# Patient Record
Sex: MALE | Race: WHITE | NOT HISPANIC OR LATINO | URBAN - METROPOLITAN AREA
[De-identification: names, ages, dates, MRNs, and addresses within clinical notes are randomized per-mention and may not be internally consistent; named-entity substitution may affect disease eponyms.]

---

## 2017-08-21 ENCOUNTER — OFFICE VISIT - HEALTHEAST (OUTPATIENT)
Dept: FAMILY MEDICINE | Facility: CLINIC | Age: 42
End: 2017-08-21

## 2017-08-21 DIAGNOSIS — E78.00 HYPERCHOLESTEREMIA: ICD-10-CM

## 2017-08-21 DIAGNOSIS — I10 HYPERTENSION: ICD-10-CM

## 2017-08-21 DIAGNOSIS — Z00.00 ROUTINE GENERAL MEDICAL EXAMINATION AT A HEALTH CARE FACILITY: ICD-10-CM

## 2017-08-21 LAB
CHOLEST SERPL-MCNC: 236 MG/DL
FASTING STATUS PATIENT QL REPORTED: YES
HDLC SERPL-MCNC: 64 MG/DL
LDLC SERPL CALC-MCNC: 131 MG/DL
TRIGL SERPL-MCNC: 204 MG/DL

## 2017-08-21 ASSESSMENT — MIFFLIN-ST. JEOR: SCORE: 1914.58

## 2017-08-22 ENCOUNTER — COMMUNICATION - HEALTHEAST (OUTPATIENT)
Dept: FAMILY MEDICINE | Facility: CLINIC | Age: 42
End: 2017-08-22

## 2017-08-22 LAB
ATRIAL RATE - MUSE: 76 BPM
DIASTOLIC BLOOD PRESSURE - MUSE: NORMAL MMHG
INTERPRETATION ECG - MUSE: NORMAL
P AXIS - MUSE: 19 DEGREES
PR INTERVAL - MUSE: 134 MS
QRS DURATION - MUSE: 90 MS
QT - MUSE: 368 MS
QTC - MUSE: 414 MS
R AXIS - MUSE: -1 DEGREES
SYSTOLIC BLOOD PRESSURE - MUSE: NORMAL MMHG
T AXIS - MUSE: 20 DEGREES
VENTRICULAR RATE- MUSE: 76 BPM

## 2017-08-23 ENCOUNTER — COMMUNICATION - HEALTHEAST (OUTPATIENT)
Dept: FAMILY MEDICINE | Facility: CLINIC | Age: 42
End: 2017-08-23

## 2017-11-07 ENCOUNTER — COMMUNICATION - HEALTHEAST (OUTPATIENT)
Dept: FAMILY MEDICINE | Facility: CLINIC | Age: 42
End: 2017-11-07

## 2018-01-09 ENCOUNTER — OFFICE VISIT - HEALTHEAST (OUTPATIENT)
Dept: FAMILY MEDICINE | Facility: CLINIC | Age: 43
End: 2018-01-09

## 2018-01-09 DIAGNOSIS — J00 ACUTE NASOPHARYNGITIS: ICD-10-CM

## 2018-01-09 ASSESSMENT — MIFFLIN-ST. JEOR: SCORE: 1966.74

## 2018-01-16 ENCOUNTER — COMMUNICATION - HEALTHEAST (OUTPATIENT)
Dept: FAMILY MEDICINE | Facility: CLINIC | Age: 43
End: 2018-01-16

## 2018-06-01 ENCOUNTER — COMMUNICATION - HEALTHEAST (OUTPATIENT)
Dept: FAMILY MEDICINE | Facility: CLINIC | Age: 43
End: 2018-06-01

## 2018-06-01 DIAGNOSIS — E78.5 HYPERLIPIDEMIA: ICD-10-CM

## 2018-08-22 ENCOUNTER — OFFICE VISIT - HEALTHEAST (OUTPATIENT)
Dept: FAMILY MEDICINE | Facility: CLINIC | Age: 43
End: 2018-08-22

## 2018-08-22 DIAGNOSIS — I10 BENIGN ESSENTIAL HYPERTENSION: ICD-10-CM

## 2018-08-22 DIAGNOSIS — Z00.00 ROUTINE GENERAL MEDICAL EXAMINATION AT A HEALTH CARE FACILITY: ICD-10-CM

## 2018-08-22 DIAGNOSIS — E66.9 OBESITY: ICD-10-CM

## 2018-08-22 DIAGNOSIS — E78.2 MIXED HYPERLIPIDEMIA: ICD-10-CM

## 2018-08-22 DIAGNOSIS — J45.909 ASTHMA: ICD-10-CM

## 2018-08-22 DIAGNOSIS — E78.2 MULTIPLE-TYPE HYPERLIPIDEMIA: ICD-10-CM

## 2018-08-22 LAB
ALBUMIN SERPL-MCNC: 4.3 G/DL (ref 3.5–5)
ALBUMIN UR-MCNC: ABNORMAL MG/DL
ALP SERPL-CCNC: 55 U/L (ref 45–120)
ALT SERPL W P-5'-P-CCNC: 100 U/L (ref 0–45)
ANION GAP SERPL CALCULATED.3IONS-SCNC: 11 MMOL/L (ref 5–18)
APPEARANCE UR: CLEAR
AST SERPL W P-5'-P-CCNC: 89 U/L (ref 0–40)
BACTERIA #/AREA URNS HPF: ABNORMAL HPF
BASOPHILS # BLD AUTO: 0 THOU/UL (ref 0–0.2)
BASOPHILS NFR BLD AUTO: 1 % (ref 0–2)
BILIRUB SERPL-MCNC: 0.8 MG/DL (ref 0–1)
BILIRUB UR QL STRIP: NEGATIVE
BUN SERPL-MCNC: 13 MG/DL (ref 8–22)
CALCIUM SERPL-MCNC: 9.8 MG/DL (ref 8.5–10.5)
CHLORIDE BLD-SCNC: 103 MMOL/L (ref 98–107)
CHOLEST SERPL-MCNC: 228 MG/DL
CO2 SERPL-SCNC: 26 MMOL/L (ref 22–31)
COLOR UR AUTO: YELLOW
CREAT SERPL-MCNC: 0.9 MG/DL (ref 0.7–1.3)
EOSINOPHIL # BLD AUTO: 0.2 THOU/UL (ref 0–0.4)
EOSINOPHIL NFR BLD AUTO: 4 % (ref 0–6)
ERYTHROCYTE [DISTWIDTH] IN BLOOD BY AUTOMATED COUNT: 11.6 % (ref 11–14.5)
FASTING STATUS PATIENT QL REPORTED: YES
GFR SERPL CREATININE-BSD FRML MDRD: >60 ML/MIN/1.73M2
GLUCOSE BLD-MCNC: 111 MG/DL (ref 70–125)
GLUCOSE UR STRIP-MCNC: NEGATIVE MG/DL
HBA1C MFR BLD: 5.9 % (ref 3.5–6)
HCT VFR BLD AUTO: 47.8 % (ref 40–54)
HDLC SERPL-MCNC: 73 MG/DL
HGB BLD-MCNC: 16.3 G/DL (ref 14–18)
HGB UR QL STRIP: NEGATIVE
KETONES UR STRIP-MCNC: NEGATIVE MG/DL
LDLC SERPL CALC-MCNC: 132 MG/DL
LEUKOCYTE ESTERASE UR QL STRIP: NEGATIVE
LYMPHOCYTES # BLD AUTO: 1.3 THOU/UL (ref 0.8–4.4)
LYMPHOCYTES NFR BLD AUTO: 36 % (ref 20–40)
MCH RBC QN AUTO: 31.9 PG (ref 27–34)
MCHC RBC AUTO-ENTMCNC: 34 G/DL (ref 32–36)
MCV RBC AUTO: 94 FL (ref 80–100)
MONOCYTES # BLD AUTO: 0.5 THOU/UL (ref 0–0.9)
MONOCYTES NFR BLD AUTO: 13 % (ref 2–10)
MUCOUS THREADS #/AREA URNS LPF: ABNORMAL LPF
NEUTROPHILS # BLD AUTO: 1.7 THOU/UL (ref 2–7.7)
NEUTROPHILS NFR BLD AUTO: 46 % (ref 50–70)
NITRATE UR QL: NEGATIVE
PH UR STRIP: 6 [PH] (ref 5–8)
PLATELET # BLD AUTO: 142 THOU/UL (ref 140–440)
PMV BLD AUTO: 9 FL (ref 7–10)
POTASSIUM BLD-SCNC: 4.3 MMOL/L (ref 3.5–5)
PROT SERPL-MCNC: 7.5 G/DL (ref 6–8)
RBC # BLD AUTO: 5.1 MILL/UL (ref 4.4–6.2)
RBC #/AREA URNS AUTO: ABNORMAL HPF
SODIUM SERPL-SCNC: 140 MMOL/L (ref 136–145)
SP GR UR STRIP: 1.02 (ref 1–1.03)
SQUAMOUS #/AREA URNS AUTO: ABNORMAL LPF
TRIGL SERPL-MCNC: 114 MG/DL
UROBILINOGEN UR STRIP-ACNC: ABNORMAL
WBC #/AREA URNS AUTO: ABNORMAL HPF
WBC: 3.6 THOU/UL (ref 4–11)

## 2018-08-22 ASSESSMENT — MIFFLIN-ST. JEOR: SCORE: 1937.83

## 2018-08-23 ENCOUNTER — COMMUNICATION - HEALTHEAST (OUTPATIENT)
Dept: FAMILY MEDICINE | Facility: CLINIC | Age: 43
End: 2018-08-23

## 2018-08-23 LAB — BACTERIA SPEC CULT: NO GROWTH

## 2019-03-20 ENCOUNTER — OFFICE VISIT - HEALTHEAST (OUTPATIENT)
Dept: FAMILY MEDICINE | Facility: CLINIC | Age: 44
End: 2019-03-20

## 2019-03-20 DIAGNOSIS — E78.2 MIXED HYPERLIPIDEMIA: ICD-10-CM

## 2019-03-20 DIAGNOSIS — F10.10 ALCOHOL ABUSE: ICD-10-CM

## 2019-03-20 DIAGNOSIS — F32.89 OTHER DEPRESSION: ICD-10-CM

## 2019-03-20 DIAGNOSIS — I10 BENIGN ESSENTIAL HYPERTENSION: ICD-10-CM

## 2019-03-20 ASSESSMENT — MIFFLIN-ST. JEOR: SCORE: 1875.57

## 2019-03-25 ENCOUNTER — OFFICE VISIT - HEALTHEAST (OUTPATIENT)
Dept: PHARMACY | Facility: CLINIC | Age: 44
End: 2019-03-25

## 2019-03-25 DIAGNOSIS — I10 BENIGN ESSENTIAL HYPERTENSION: ICD-10-CM

## 2019-03-25 DIAGNOSIS — F10.10 ALCOHOL ABUSE: ICD-10-CM

## 2019-03-25 DIAGNOSIS — G47.00 INSOMNIA, UNSPECIFIED TYPE: ICD-10-CM

## 2019-03-25 DIAGNOSIS — F32.A DEPRESSION, UNSPECIFIED DEPRESSION TYPE: ICD-10-CM

## 2019-03-25 DIAGNOSIS — E78.2 MULTIPLE-TYPE HYPERLIPIDEMIA: ICD-10-CM

## 2019-03-25 LAB
ALBUMIN SERPL-MCNC: 3.8 G/DL (ref 3.5–5)
ALP SERPL-CCNC: 45 U/L (ref 45–120)
ALT SERPL W P-5'-P-CCNC: 100 U/L (ref 0–45)
AST SERPL W P-5'-P-CCNC: 71 U/L (ref 0–40)
BILIRUB DIRECT SERPL-MCNC: 0.2 MG/DL
BILIRUB SERPL-MCNC: 0.2 MG/DL (ref 0–1)
PROT SERPL-MCNC: 6.7 G/DL (ref 6–8)

## 2019-03-26 ENCOUNTER — AMBULATORY - HEALTHEAST (OUTPATIENT)
Dept: PHARMACY | Facility: CLINIC | Age: 44
End: 2019-03-26

## 2019-03-26 DIAGNOSIS — F10.10 ALCOHOL ABUSE: ICD-10-CM

## 2019-04-21 ENCOUNTER — COMMUNICATION - HEALTHEAST (OUTPATIENT)
Dept: FAMILY MEDICINE | Facility: CLINIC | Age: 44
End: 2019-04-21

## 2019-04-21 DIAGNOSIS — F10.10 ALCOHOL ABUSE: ICD-10-CM

## 2019-04-25 ENCOUNTER — COMMUNICATION - HEALTHEAST (OUTPATIENT)
Dept: FAMILY MEDICINE | Facility: CLINIC | Age: 44
End: 2019-04-25

## 2019-04-25 DIAGNOSIS — F32.89 OTHER DEPRESSION: ICD-10-CM

## 2019-04-25 DIAGNOSIS — F10.10 ALCOHOL ABUSE: ICD-10-CM

## 2019-06-10 ENCOUNTER — COMMUNICATION - HEALTHEAST (OUTPATIENT)
Dept: FAMILY MEDICINE | Facility: CLINIC | Age: 44
End: 2019-06-10

## 2019-06-10 DIAGNOSIS — F10.10 ALCOHOL ABUSE: ICD-10-CM

## 2019-07-18 ENCOUNTER — COMMUNICATION - HEALTHEAST (OUTPATIENT)
Dept: FAMILY MEDICINE | Facility: CLINIC | Age: 44
End: 2019-07-18

## 2019-07-18 DIAGNOSIS — F10.10 ALCOHOL ABUSE: ICD-10-CM

## 2019-09-23 ENCOUNTER — OFFICE VISIT - HEALTHEAST (OUTPATIENT)
Dept: PHARMACY | Facility: CLINIC | Age: 44
End: 2019-09-23

## 2019-09-23 DIAGNOSIS — G47.00 INSOMNIA, UNSPECIFIED TYPE: ICD-10-CM

## 2019-09-23 DIAGNOSIS — F10.10 ALCOHOL ABUSE: ICD-10-CM

## 2019-09-23 DIAGNOSIS — F32.A DEPRESSION, UNSPECIFIED DEPRESSION TYPE: ICD-10-CM

## 2019-09-23 DIAGNOSIS — F32.89 OTHER DEPRESSION: ICD-10-CM

## 2019-09-23 LAB
ALBUMIN SERPL-MCNC: 4.1 G/DL (ref 3.5–5)
ALP SERPL-CCNC: 46 U/L (ref 45–120)
ALT SERPL W P-5'-P-CCNC: 79 U/L (ref 0–45)
AST SERPL W P-5'-P-CCNC: 59 U/L (ref 0–40)
BILIRUB DIRECT SERPL-MCNC: 0.2 MG/DL
BILIRUB SERPL-MCNC: 0.3 MG/DL (ref 0–1)
PROT SERPL-MCNC: 7.1 G/DL (ref 6–8)

## 2019-09-25 ENCOUNTER — AMBULATORY - HEALTHEAST (OUTPATIENT)
Dept: PHARMACY | Facility: CLINIC | Age: 44
End: 2019-09-25

## 2019-09-25 DIAGNOSIS — F10.10 ALCOHOL ABUSE: ICD-10-CM

## 2019-09-26 ENCOUNTER — COMMUNICATION - HEALTHEAST (OUTPATIENT)
Dept: FAMILY MEDICINE | Facility: CLINIC | Age: 44
End: 2019-09-26

## 2019-09-26 DIAGNOSIS — I10 BENIGN ESSENTIAL HYPERTENSION: ICD-10-CM

## 2019-09-26 DIAGNOSIS — F10.10 ALCOHOL ABUSE: ICD-10-CM

## 2019-09-30 ENCOUNTER — COMMUNICATION - HEALTHEAST (OUTPATIENT)
Dept: FAMILY MEDICINE | Facility: CLINIC | Age: 44
End: 2019-09-30

## 2019-10-10 ENCOUNTER — AMBULATORY - HEALTHEAST (OUTPATIENT)
Dept: PHARMACY | Facility: CLINIC | Age: 44
End: 2019-10-10

## 2019-10-10 DIAGNOSIS — F41.9 ANXIETY: ICD-10-CM

## 2019-10-10 RX ORDER — HYDROXYZINE PAMOATE 25 MG/1
25-50 CAPSULE ORAL EVERY 6 HOURS PRN
Qty: 60 CAPSULE | Refills: 2 | Status: SHIPPED | OUTPATIENT
Start: 2019-10-10

## 2019-10-22 ENCOUNTER — COMMUNICATION - HEALTHEAST (OUTPATIENT)
Dept: FAMILY MEDICINE | Facility: CLINIC | Age: 44
End: 2019-10-22

## 2019-10-22 DIAGNOSIS — F10.10 ALCOHOL ABUSE: ICD-10-CM

## 2019-10-28 ENCOUNTER — COMMUNICATION - HEALTHEAST (OUTPATIENT)
Dept: NURSING | Facility: CLINIC | Age: 44
End: 2019-10-28

## 2019-10-28 DIAGNOSIS — F10.10 ALCOHOL ABUSE: ICD-10-CM

## 2019-11-20 ENCOUNTER — COMMUNICATION - HEALTHEAST (OUTPATIENT)
Dept: FAMILY MEDICINE | Facility: CLINIC | Age: 44
End: 2019-11-20

## 2019-11-20 DIAGNOSIS — F32.89 OTHER DEPRESSION: ICD-10-CM

## 2020-01-01 ENCOUNTER — COMMUNICATION - HEALTHEAST (OUTPATIENT)
Dept: FAMILY MEDICINE | Facility: CLINIC | Age: 45
End: 2020-01-01

## 2020-01-01 ENCOUNTER — RECORDS - HEALTHEAST (OUTPATIENT)
Dept: ADMINISTRATIVE | Facility: OTHER | Age: 45
End: 2020-01-01

## 2020-01-01 ENCOUNTER — COMMUNICATION - HEALTHEAST (OUTPATIENT)
Dept: SCHEDULING | Facility: CLINIC | Age: 45
End: 2020-01-01

## 2020-01-01 ENCOUNTER — AMBULATORY - HEALTHEAST (OUTPATIENT)
Dept: FAMILY MEDICINE | Facility: CLINIC | Age: 45
End: 2020-01-01

## 2020-01-01 ENCOUNTER — RECORDS - HEALTHEAST (OUTPATIENT)
Dept: HEALTH INFORMATION MANAGEMENT | Facility: CLINIC | Age: 45
End: 2020-01-01

## 2020-01-01 ENCOUNTER — COMMUNICATION - HEALTHEAST (OUTPATIENT)
Dept: NURSING | Facility: CLINIC | Age: 45
End: 2020-01-01

## 2020-01-01 DIAGNOSIS — F32.89 OTHER DEPRESSION: ICD-10-CM

## 2020-01-01 DIAGNOSIS — F32.89 OTHER SPECIFIED DEPRESSIVE EPISODES: ICD-10-CM

## 2020-01-01 DIAGNOSIS — F10.10 ALCOHOL ABUSE: ICD-10-CM

## 2020-01-01 DIAGNOSIS — I10 BENIGN ESSENTIAL HYPERTENSION: ICD-10-CM

## 2020-01-01 DIAGNOSIS — J00 ACUTE NASOPHARYNGITIS: ICD-10-CM

## 2020-01-01 LAB
ALT SERPL W/O P-5'-P-CCNC: 50 U/L
AST SERPL-CCNC: 178 IU/L (ref 15–46)
CREAT SERPL-MCNC: 0.5 MG/DL (ref 0.66–1.25)
GFR ESTIMATE EXT - HISTORICAL: >60 ML/MIN/1.73M2
GFR ESTIMATE, IF BLACK EXT - HISTORICAL: >60 ML/MIN/1.73M2

## 2020-01-01 RX ORDER — ALBUTEROL SULFATE 90 UG/1
2 AEROSOL, METERED RESPIRATORY (INHALATION) EVERY 4 HOURS PRN
Qty: 8.5 INHALER | Refills: 2 | Status: SHIPPED | OUTPATIENT
Start: 2020-01-01

## 2020-01-01 RX ORDER — CLONIDINE HYDROCHLORIDE 0.1 MG/1
TABLET ORAL
Qty: 90 TABLET | Refills: 0 | Status: SHIPPED | OUTPATIENT
Start: 2020-01-01

## 2020-01-01 RX ORDER — TRAZODONE HYDROCHLORIDE 50 MG/1
TABLET, FILM COATED ORAL
Qty: 180 TABLET | Refills: 0 | Status: SHIPPED | OUTPATIENT
Start: 2020-01-01

## 2021-01-01 ENCOUNTER — RECORDS - HEALTHEAST (OUTPATIENT)
Dept: INTENSIVE CARE | Facility: HOSPITAL | Age: 46
End: 2021-01-01

## 2021-01-01 ENCOUNTER — ANESTHESIA - HEALTHEAST (OUTPATIENT)
Dept: INTENSIVE CARE | Facility: HOSPITAL | Age: 46
End: 2021-01-01

## 2021-01-20 ENCOUNTER — COMMUNICATION - HEALTHEAST (OUTPATIENT)
Dept: PULMONOLOGY | Facility: OTHER | Age: 46
End: 2021-01-20

## 2021-05-26 NOTE — PROGRESS NOTES
[facility]  Assessment & Plan                                                     Depression/Anxiety: Uncontrolled. In addition to alcohol addiction, patient has stressor of his marriage currently. Reviewed starting an SSRI to help with his anxiety which may indirectly help with his cravings as well. Reviewed exprectations and side effects. Will stop clonidine today due to unclear benefit.   PLAN:  1. Stop clonidine  2. Start sertraline 50 mg daily.     Etoh Abuse: Patient is undergoing outpatient therapy, which should hopefully be helpful for him. Reviewed medications used for cravings -- he would like to address the cravings, therefore recommended starting a medication once his liver enzymes return. Testing LFTs today for baseline.   Ideally will start naltrexone 50 mg as long as liver function is WNL. Otherwise will consider acamprosate, although adherence is a barrier and will need to closely monitor for suicidal thinking.   PLAN:   1. Liver function tests today.     Insomnia: Slightly improved with addition of trazodone. Appears that his underlying anxiety is contributing to his insomnia, therefore will see if improves with sertraline start. Reviewed that if sleep starts to improve, he can use trazodone PRN.     Hypertension: BP well controlled, continue current regimen and will continue monitoring.     Hyperlipidemia: Stable. Will have him continue atorvastatin at this time. If LFTs are elevated, will hold atorvastatin. Consider rechecking lipids in the future now that he is no longer drinking.     Follow Up  Phone call when liver function labs return    Subjective & Objective                                                     Jamey Gallegos is a 43 y.o. male coming in for an initial visit for Medication Therapy Management. He was referred to me from Jake Singleton MD    Chief Complaint: cravings and depression/anxiety    Medication Adherence/Access: Brought a list of the medications he is  "taking.     Depression/Anxiety: 1.5 mos ago wife wanted a divorce and his drinking contributed. Has been depressed for a while and anxiety. Now anxiety is off the charts. Trouble concentrating. Found out his wife was cheating on him emotionally. Now seeing a counselor and attending outpatient rehab at [facility]. Does not have much of a support system, but has some friends he may reach out to.     Etoh Abuse: Weekends are tough use to drink a lot. Cravings are routine based. Used to have wine with dinner. Wife is a trigger. Sober now since 3/9. Reports that he was a \"high-functioning alcoholic.\" Would be sneaking shots of vodka. Now no alcohol at home. Reports having cravings, but he thinks it may be a byproduct of his anxiety. He is concerned about the cravings and would like to address them.   Last liver function tested 8/22/18    Insomnia: Started on trazodone 50 mg HS -- added 3/20/19. Reports that he takes about an hour before bedtime and it does make him sleeping. Makes him sleepy. Was not sleeping well or restful. Waking up and not falling back asleep. Not getting out of his head. Now sleeping better, but will still wake up 1-2 times, which is not as much as before.     Hypertension: Currently taking lisinopril hydrochlorothiazide 20-12.5 mg daily and clonidine 0.1 mg nightly (clonidine added 3/20/2019).     Hyperlipidemia: Currently taking atorvastatin 10 mg AM. Denies myalgias. Last lipids checked 8/22/2018.      PMH: reviewed in EPIC   Allergies/ADRs: reviewed in EPIC   Alcohol: reviewed in EPIC   Tobacco:   Social History     Tobacco Use   Smoking Status Never Smoker   Smokeless Tobacco Never Used     Today's Vitals: There were no vitals filed for this visit.  ----------------    Much or all of the text in this note was generated through the use of Dragon Dictate voice-to-text software. Errors in spelling or words which seem out of context are unintentional. Sound alike errors, in particular, may have " escaped editing.    The patient was given a summary of these recommendations as an after visit summary    I spent 45 minutes with this patient today.   All changes were made via collaborative practice agreement with Jake Singleton MD. A copy of the visit note was provided to the patient's provider.     Sherri Delgado, Pharm.D., [facility]  Medication Therapy Management Pharmacist  [facility]     Current Outpatient Medications   Medication Sig Dispense Refill     albuterol (PROAIR HFA) 90 mcg/actuation inhaler Inhale 2 puffs every 4 (four) hours as needed for wheezing. 8.5 Inhaler 2     atorvastatin (LIPITOR) 10 MG tablet Take 1 tablet (10 mg total) by mouth at bedtime. 90 tablet 3     cloNIDine HCl (CATAPRES) 0.1 MG tablet One tablet at bedtime 60 tablet 2     lisinopril-hydrochlorothiazide (ZESTORETIC) 20-12.5 mg per tablet Take 1 tablet by mouth daily. 90 tablet 3     loratadine (CLARITIN) 10 mg tablet Take 1 tablet (10 mg total) by mouth daily. 30 tablet 2     MULTIVIT &MINERALS/FERROUS FUM (MULTI VITAMIN ORAL) Take 1 tablet by mouth daily.       predniSONE (DELTASONE) 20 MG tablet Take 2 tablets (40 mg total) by mouth daily. 10 tablet 0     traZODone (DESYREL) 50 MG tablet Take 1 tablet (50 mg total) by mouth at bedtime. 30 tablet 1     No current facility-administered medications for this visit.

## 2021-05-27 NOTE — PATIENT INSTRUCTIONS - HE
Recommendations from today's PharmD medication management visit                                                       1. Stop clonidine.     2. Start sertraline 50 mg daily.     3. We will check your liver enzymes today. I will call you when those returns.     Next pharmacist visit: Phone call after labs return    My Pharmacist's contact information:                                                       It was a pleasure seeing you today to discuss your medications!  Please feel free to contact me with any questions or concerns you have. I look forward to seeing you again to help you get the most benefit from your medications!    To reschedule your PharmD appointment, please call the clinic directly or you may call the MTM scheduling line at [phone] or toll-free at [phone]:   [facility] (available for appointments Mondays and Thursdays)  [facility] (available for appointments Tuesday, Wednesday & Friday)     Sherri Delgado, Darius, BCACP  Medication Therapy Management Pharmacist  [facility]    You may receive a survey about the [facility] services you received by email and/or US Mail.  I would appreciate your feedback to help me serve you better in the future. Your comments will be anonymous.

## 2021-05-28 NOTE — TELEPHONE ENCOUNTER
RN cannot approve Refill Request: Naltrexone    RN can NOT refill this medication med is not covered by policy/route to provider. Last office visit: 3/20/2019 Jake Singleton MD Last Physical: 8/22/2018 Last MTM visit: Visit date not found Last visit same specialty: 3/20/2019 Jake Singleton MD.  Next visit within 3 mo: Visit date not found  Next physical within 3 mo: Visit date not found      Brigid Etienne, [facility] Connection Triage/Med Refill 4/28/2019    Requested Prescriptions   Pending Prescriptions Disp Refills     naltrexone (DEPADE) 50 mg tablet [Pharmacy Med Name: NALTREXONE 50 MG TABLET] 30 tablet 0     Sig: TAKE 1 TABLET BY MOUTH EVERY DAY       There is no refill protocol information for this order      Signed Prescriptions Disp Refills    traZODone (DESYREL) 50 MG tablet 30 tablet 11     Sig: Take 1 tablet (50 mg total) by mouth at bedtime.       Tricyclics/Misc Antidepressant/Antianxiety Meds Refill Protocol Passed - 4/25/2019  5:39 PM        Passed - PCP or prescribing provider visit in last year     Last office visit with prescriber/PCP: 3/20/2019 Jake Singleton MD OR same dept: 3/20/2019 Jake Singleton MD OR same specialty: 3/20/2019 Jake Singleton MD  Last physical: 8/22/2018 Last MTM visit: Visit date not found   Next visit within 3 mo: Visit date not found  Next physical within 3 mo: Visit date not found  Prescriber OR PCP: Jake Singleton MD  Last diagnosis associated with med order: 1. Alcohol abuse  - naltrexone (DEPADE) 50 mg tablet [Pharmacy Med Name: NALTREXONE 50 MG TABLET]; TAKE 1 TABLET BY MOUTH EVERY DAY  Dispense: 30 tablet; Refill: 0  - sertraline (ZOLOFT) 50 MG tablet [Pharmacy Med Name: SERTRALINE HCL 50 MG TABLET]; Take 1 tablet (50 mg total) by mouth daily.  Dispense: 30 tablet; Refill: 0    2. Other depression  - traZODone (DESYREL) 50 MG tablet; Take 1 tablet (50 mg total) by mouth at bedtime.  Dispense: 30 tablet; Refill: 11    If protocol passes may refill for 12 months if  within 3 months of last provider visit (or a total of 15 months).           Refused Prescriptions Disp Refills     sertraline (ZOLOFT) 50 MG tablet [Pharmacy Med Name: SERTRALINE HCL 50 MG TABLET] 30 tablet 0     Sig: Take 1 tablet (50 mg total) by mouth daily.       SSRI Refill Protocol  Passed - 4/25/2019  5:39 PM        Passed - PCP or prescribing provider visit in last year     Last office visit with prescriber/PCP: 3/20/2019 Jake Singleton MD OR same dept: 3/20/2019 Jake Singleton MD OR same specialty: 3/20/2019 Jake Singleton MD  Last physical: 8/22/2018 Last MTM visit: Visit date not found   Next visit within 3 mo: Visit date not found  Next physical within 3 mo: Visit date not found  Prescriber OR PCP: Jake Singleton MD  Last diagnosis associated with med order: 1. Alcohol abuse  - naltrexone (DEPADE) 50 mg tablet [Pharmacy Med Name: NALTREXONE 50 MG TABLET]; TAKE 1 TABLET BY MOUTH EVERY DAY  Dispense: 30 tablet; Refill: 0  - sertraline (ZOLOFT) 50 MG tablet [Pharmacy Med Name: SERTRALINE HCL 50 MG TABLET]; Take 1 tablet (50 mg total) by mouth daily.  Dispense: 30 tablet; Refill: 0    2. Other depression  - traZODone (DESYREL) 50 MG tablet; Take 1 tablet (50 mg total) by mouth at bedtime.  Dispense: 30 tablet; Refill: 11    If protocol passes may refill for 12 months if within 3 months of last provider visit (or a total of 15 months).

## 2021-05-28 NOTE — TELEPHONE ENCOUNTER
Refill Approved    Rx renewed per Medication Renewal Policy. Medication was last renewed on 3/25/19.    Nasrin Lui, [facility] Connection Triage/Med Refill 4/23/2019     Requested Prescriptions   Pending Prescriptions Disp Refills     sertraline (ZOLOFT) 50 MG tablet [Pharmacy Med Name: SERTRALINE HCL 50 MG TABLET] 30 tablet 0     Sig: TAKE 1 TABLET BY MOUTH EVERY DAY       SSRI Refill Protocol  Passed - 4/21/2019  8:41 AM        Passed - PCP or prescribing provider visit in last year     Last office visit with prescriber/PCP: 3/20/2019 Jake Singleton MD OR same dept: 3/20/2019 Jake Singleton MD OR same specialty: 3/20/2019 Jake Singleton MD  Last physical: 8/22/2018 Last MTM visit: Visit date not found   Next visit within 3 mo: Visit date not found  Next physical within 3 mo: Visit date not found  Prescriber OR PCP: Jake Singleton MD  Last diagnosis associated with med order: 1. Alcohol abuse  - sertraline (ZOLOFT) 50 MG tablet [Pharmacy Med Name: SERTRALINE HCL 50 MG TABLET]; TAKE 1 TABLET BY MOUTH EVERY DAY  Dispense: 30 tablet; Refill: 0    If protocol passes may refill for 12 months if within 3 months of last provider visit (or a total of 15 months).

## 2021-05-28 NOTE — TELEPHONE ENCOUNTER
Refill Approved: Trazodone    Rx renewed per Medication Renewal Policy. Medication was last renewed on 3/20/2019 with 1 refill.   Last office visit: 3/25/2019 with MTM MUKUND Delgado, PharmD, 3/20/2019 with PCP Dr PRINCESS Doty.     Brigid Etienne, [facility] Triage/Med Refill 4/28/2019     Requested Prescriptions   Pending Prescriptions Disp Refills     naltrexone (DEPADE) 50 mg tablet [Pharmacy Med Name: NALTREXONE 50 MG TABLET] 30 tablet 0     Sig: TAKE 1 TABLET BY MOUTH EVERY DAY       There is no refill protocol information for this order        traZODone (DESYREL) 50 MG tablet [Pharmacy Med Name: TRAZODONE 50 MG TABLET] 30 tablet 1     Sig: TAKE 1 TABLET BY MOUTH EVERYDAY AT BEDTIME       Tricyclics/Misc Antidepressant/Antianxiety Meds Refill Protocol Passed - 4/25/2019  5:39 PM        Passed - PCP or prescribing provider visit in last year     Last office visit with prescriber/PCP: 3/20/2019 Jake Singleton MD OR same dept: 3/20/2019 Jake Singleton MD OR same specialty: 3/20/2019 Jake Singleton MD  Last physical: 8/22/2018 Last MTM visit: Visit date not found   Next visit within 3 mo: Visit date not found  Next physical within 3 mo: Visit date not found  Prescriber OR PCP: Jake Singleton MD  Last diagnosis associated with med order: 1. Alcohol abuse  - naltrexone (DEPADE) 50 mg tablet [Pharmacy Med Name: NALTREXONE 50 MG TABLET]; TAKE 1 TABLET BY MOUTH EVERY DAY  Dispense: 30 tablet; Refill: 0  - sertraline (ZOLOFT) 50 MG tablet [Pharmacy Med Name: SERTRALINE HCL 50 MG TABLET]; TAKE 1 TABLET BY MOUTH EVERY DAY  Dispense: 30 tablet; Refill: 0    2. Other depression  - traZODone (DESYREL) 50 MG tablet [Pharmacy Med Name: TRAZODONE 50 MG TABLET]; TAKE 1 TABLET BY MOUTH EVERYDAY AT BEDTIME  Dispense: 30 tablet; Refill: 1    If protocol passes may refill for 12 months if within 3 months of last provider visit (or a total of 15 months).             sertraline (ZOLOFT) 50 MG tablet [Pharmacy Med Name: SERTRALINE HCL 50  MG TABLET] 30 tablet 0     Sig: TAKE 1 TABLET BY MOUTH EVERY DAY       SSRI Refill Protocol  Passed - 4/25/2019  5:39 PM        Passed - PCP or prescribing provider visit in last year     Last office visit with prescriber/PCP: 3/20/2019 Jake Singleton MD OR same dept: 3/20/2019 Jake Singleton MD OR same specialty: 3/20/2019 Jake Singleton MD  Last physical: 8/22/2018 Last MTM visit: Visit date not found   Next visit within 3 mo: Visit date not found  Next physical within 3 mo: Visit date not found  Prescriber OR PCP: Jake Singleton MD  Last diagnosis associated with med order: 1. Alcohol abuse  - naltrexone (DEPADE) 50 mg tablet [Pharmacy Med Name: NALTREXONE 50 MG TABLET]; TAKE 1 TABLET BY MOUTH EVERY DAY  Dispense: 30 tablet; Refill: 0  - sertraline (ZOLOFT) 50 MG tablet [Pharmacy Med Name: SERTRALINE HCL 50 MG TABLET]; TAKE 1 TABLET BY MOUTH EVERY DAY  Dispense: 30 tablet; Refill: 0    2. Other depression  - traZODone (DESYREL) 50 MG tablet [Pharmacy Med Name: TRAZODONE 50 MG TABLET]; TAKE 1 TABLET BY MOUTH EVERYDAY AT BEDTIME  Dispense: 30 tablet; Refill: 1    If protocol passes may refill for 12 months if within 3 months of last provider visit (or a total of 15 months).

## 2021-05-29 NOTE — TELEPHONE ENCOUNTER
RN cannot approve Refill Request    RN can NOT refill this medication med is not covered by policy/route to provider. Last office visit: 3/20/2019 Jake Singleton MD Last Physical: 8/22/2018 Last MTM visit: Visit date not found Last visit same specialty: 3/20/2019 Jake Singleton MD.  Next visit within 3 mo: Visit date not found  Next physical within 3 mo: Visit date not found      Brigid Etienne, [facility] Triage/Med Refill 6/11/2019    Requested Prescriptions   Pending Prescriptions Disp Refills     naltrexone (DEPADE) 50 mg tablet [Pharmacy Med Name: NALTREXONE 50 MG TABLET] 30 tablet 0     Sig: TAKE 1 TABLET BY MOUTH EVERY DAY       There is no refill protocol information for this order

## 2021-05-30 NOTE — TELEPHONE ENCOUNTER
RN cannot approve Refill Request    RN can NOT refill this medication med is not covered by policy/route to provider. Last office visit: 3/20/2019 Jake Singleton MD Last Physical: 8/22/2018 Last MTM visit: Visit date not found Last visit same specialty: 3/20/2019 Jake Singleton MD.  Next visit within 3 mo: Visit date not found  Next physical within 3 mo: Visit date not found      Nicole Forte, [facility] Connection Triage/Med Refill 7/18/2019    Requested Prescriptions   Pending Prescriptions Disp Refills     naltrexone (DEPADE) 50 mg tablet [Pharmacy Med Name: NALTREXONE 50 MG TABLET] 30 tablet 0     Sig: TAKE 1 TABLET BY MOUTH EVERY DAY       There is no refill protocol information for this order

## 2021-05-31 VITALS — WEIGHT: 239.5 LBS | HEIGHT: 69 IN | BODY MASS INDEX: 35.47 KG/M2

## 2021-05-31 VITALS — BODY MASS INDEX: 33.77 KG/M2 | HEIGHT: 69 IN | WEIGHT: 228 LBS

## 2021-06-01 VITALS — HEIGHT: 69 IN | WEIGHT: 233.13 LBS | BODY MASS INDEX: 34.53 KG/M2

## 2021-06-01 NOTE — PATIENT INSTRUCTIONS - HE
Recommendations from today's PharmD medication management visit                                                       1. Increase sertraline to 100 mg daily.     2. I will refill trazodone -- ok to take 100 mg at night if needed for sleep.     3. Use clonidine as needed for anxiety    4. I will refill the naltrexone -- ok to use daily for cravings.     Next pharmacist visit: Trang in 1 week    It was great to speak with you today.  I value your experience and would be very thankful for your time with providing feedback on our clinic survey. You may receive a survey via email or text message in the next few days.     My Pharmacist's contact information:                                                       It was a pleasure seeing you today to discuss your medications!  Please feel free to contact me with any questions or concerns you have. I look forward to seeing you again to help you get the most benefit from your medications!    To reschedule your PharmD appointment, please call the clinic directly or you may call the [facility] scheduling line at [phone] or toll-free at [phone]:   [facility] (available for appointments Mondays and Thursdays)  [facility] (available for appointments Tuesday, Wednesday & Friday)     Sherri Delgado, Darius, BCACP  Medication Therapy Management Pharmacist  [facility]

## 2021-06-01 NOTE — PROGRESS NOTES
MTM Follow Up Encounter  Assessment & Plan                                                     Depression/Anxiety: Uncontrolled. Patient was visibly anxious today -- very shaky and sweaty. Reviewed that he is on a low dose of sertraline, therefore before switching to another SSRI will increase to 100 mg. If no improvement at all at 4 weeks, then will switch to a different medication, but if some improvement then will continue and consider maximizing the dose to 200 mg. He was agreeable. Until then will have him use clonidine PRN anxiety. I will MyChart him after a week and if not helpful, will try hydroxyzine. Will want to avoid benzos due to his alcohol addiction.   PLAN:   1. Increase sertraline to 100 mg daily  2. Use clonidine 0.1 mg PRN anxiety     Etoh Abuse: Congratulated patient on being sober. Continue attending therapy and AA meetings. Will have him take naltrexone regularly to avoid any trigger for alcohol, but will check hepatic function today before refilling. Will have him stay off atorvastatin for now, but can consider restarting in the future depending on LFTs.   PLAN:   1. Hepatic function today     Insomnia: Unsure if trazodone is helping, but he is also having horrible anxiety. See above. Will also change the trazodone prescription so that he can take 100 mg if needed.   PLAN:   1. Increase trazodone to  mg HS PRN    Follow Up  1 week Trang    Subjective & Objective                                                       Jamey Gallegos is a 43 y.o. male coming in for a follow up visit for Medication Therapy Management. He was referred to me from Jake Singleton MD and referred by self for today's appointment.     Chief Complaint: Worsening depression and anxiety.     Medication Adherence/Access: Taking medications daily.     Depression/Anxiety: Going through a divorce with his wife -- they are still living together and in the process. Reports depression and anxiety, but anxiety now very  "bad. Continues to see counselor and one at work. Does have support system at work, bonita has been very understanding. At last MTM appt, stopped clonidine and started sertraline 50 mg daily. Not sure if he has seen much of an improvement. Reports divorce process is debilitating, went to a hotel the other day. When panic attack, heart races, tension in chest, nervous energy. Would like to avoid addictive substances. Has clonidine at home but not using.      Etoh Abuse: Sober since 3/9. Reports that he was a \"high-functioning alcoholic.\" At last MTM appt, started patient on naltrexone 50 mg daily -- was taking daily, but now taking PRN. Will take when he has a craving or a tough day. Has cravings, but does not act on them. Completed rehab. Goes to .   Last liver function tested 3/25/19 -- atorvastatin 10 mg on hold.      Insomnia: Started on trazodone 50 mg HS -- added 3/20/19. Reports that he is having a hard time sleeping -- he wonders if it has to do with the anxiety, but not sure if trazodone is helping.       PMH: reviewed in EPIC   Allergies/ADRs: reviewed in EPIC   Alcohol: reviewed in Epic -- sober now  Tobacco:   Social History     Tobacco Use   Smoking Status Never Smoker   Smokeless Tobacco Never Used     Today's Vitals:   Vitals:    09/23/19 1450   Weight: (!) 228 lb 6.4 oz (103.6 kg)     ----------------    Much or all of the text in this note was generated through the use of Dragon Dictate voice-to-text software. Errors in spelling or words which seem out of context are unintentional. Sound alike errors, in particular, may have escaped editing.    The patient was given a summary of these recommendations as an after visit summary    I spent 30 minutes with this patient today;   All changes were made via collaborative practice agreement with Jake Singleton MD. A copy of the visit note was provided to the patient's provider.     Sherri Delgado, Pharm.D., BCACP  Medication Therapy Management " Pharmacist  [facility]       Current Outpatient Medications   Medication Sig Dispense Refill     albuterol (PROAIR HFA) 90 mcg/actuation inhaler Inhale 2 puffs every 4 (four) hours as needed for wheezing. 8.5 Inhaler 2     betamethasone dipropionate (DIPROLENE) 0.05 % ointment Apply topically daily as needed.       lisinopril-hydrochlorothiazide (ZESTORETIC) 20-12.5 mg per tablet Take 1 tablet by mouth daily. 90 tablet 3     loratadine (CLARITIN) 10 mg tablet Take 1 tablet (10 mg total) by mouth daily. 30 tablet 2     MULTIVIT &MINERALS/FERROUS FUM (MULTI VITAMIN ORAL) Take 1 tablet by mouth daily.       naltrexone (DEPADE) 50 mg tablet TAKE 1 TABLET BY MOUTH EVERY DAY 30 tablet 0     sertraline (ZOLOFT) 50 MG tablet TAKE 1 TABLET BY MOUTH EVERY DAY 90 tablet 3     traZODone (DESYREL) 50 MG tablet Take 1 tablet (50 mg total) by mouth at bedtime. 30 tablet 11     No current facility-administered medications for this visit.

## 2021-06-02 VITALS — WEIGHT: 219.4 LBS | BODY MASS INDEX: 32.5 KG/M2 | HEIGHT: 69 IN

## 2021-06-02 NOTE — TELEPHONE ENCOUNTER
Refill Approved    Rx renewed per Medication Renewal Policy. Medication was last renewed on 9/23/19.    Nasrin Lui, [facility] Connection Triage/Med Refill 10/22/2019     Requested Prescriptions   Pending Prescriptions Disp Refills     sertraline (ZOLOFT) 100 MG tablet [Pharmacy Med Name: SERTRALINE  MG TABLET] 30 tablet 0     Sig: TAKE 1 TABLET BY MOUTH EVERY DAY       SSRI Refill Protocol  Passed - 10/22/2019  1:21 AM        Passed - PCP or prescribing provider visit in last year     Last office visit with prescriber/PCP: 3/20/2019 Jake Singleton MD OR same dept: 3/20/2019 Jake Singleton MD OR same specialty: 3/20/2019 Jake Singleton MD  Last physical: 8/22/2018 Last MTM visit: Visit date not found   Next visit within 3 mo: Visit date not found  Next physical within 3 mo: Visit date not found  Prescriber OR PCP: Jake Singleton MD  Last diagnosis associated with med order: 1. Alcohol abuse  - sertraline (ZOLOFT) 100 MG tablet [Pharmacy Med Name: SERTRALINE  MG TABLET]; TAKE 1 TABLET BY MOUTH EVERY DAY  Dispense: 30 tablet; Refill: 0    If protocol passes may refill for 12 months if within 3 months of last provider visit (or a total of 15 months).

## 2021-06-03 VITALS — WEIGHT: 228.4 LBS | BODY MASS INDEX: 33.73 KG/M2

## 2021-06-03 NOTE — TELEPHONE ENCOUNTER
Refill Approved    Rx renewed per Medication Renewal Policy. Medication was last renewed on 9/23/19.    Nasrin Lui, [facility] Triage/Med Refill 11/21/2019     Requested Prescriptions   Pending Prescriptions Disp Refills     traZODone (DESYREL) 50 MG tablet [Pharmacy Med Name: TRAZODONE 50 MG TABLET] 60 tablet 1     Sig: TAKE 1-2 TABLETS ( MG TOTAL) BY MOUTH AT BEDTIME.       Tricyclics/Misc Antidepressant/Antianxiety Meds Refill Protocol Passed - 11/20/2019  1:57 AM        Passed - PCP or prescribing provider visit in last year     Last office visit with prescriber/PCP: 3/20/2019 Jake Singleton MD OR same dept: 3/20/2019 Jake Singleton MD OR same specialty: 3/20/2019 Jake Singleton MD  Last physical: 8/22/2018 Last MTM visit: Visit date not found   Next visit within 3 mo: Visit date not found  Next physical within 3 mo: Visit date not found  Prescriber OR PCP: Jake Singleton MD  Last diagnosis associated with med order: 1. Other depression  - traZODone (DESYREL) 50 MG tablet [Pharmacy Med Name: TRAZODONE 50 MG TABLET]; Take 1-2 tablets ( mg total) by mouth at bedtime.  Dispense: 60 tablet; Refill: 1    If protocol passes may refill for 12 months if within 3 months of last provider visit (or a total of 15 months).

## 2021-06-06 NOTE — TELEPHONE ENCOUNTER
Refill Approved    Rx renewed per Medication Renewal Policy. Medication was last renewed on 11/1/19.    Keysha Cameron, [facility] Connection Triage/Med Refill 2/19/2020     Requested Prescriptions   Pending Prescriptions Disp Refills     sertraline (ZOLOFT) 100 MG tablet [Pharmacy Med Name: SERTRALINE  MG TABLET] 45 tablet 1     Sig: TAKE 1.5 TABLETS BY MOUTH DAILY       [facility] Refill Protocol  Passed - 2/16/2020  9:31 AM        Passed - PCP or prescribing provider visit in last year     Last office visit with prescriber/PCP: 3/20/2019 Jake Singleton MD OR same dept: Visit date not found OR same specialty: Visit date not found  Last physical: 8/22/2018 Last MTM visit: Visit date not found   Next visit within 3 mo: Visit date not found  Next physical within 3 mo: Visit date not found  Prescriber OR PCP: Jake Singleton MD  Last diagnosis associated with med order: 1. Alcohol abuse  - sertraline (ZOLOFT) 100 MG tablet [Pharmacy Med Name: SERTRALINE  MG TABLET]; TAKE 1.5 TABLETS BY MOUTH DAILY  Dispense: 45 tablet; Refill: 1    If protocol passes may refill for 12 months if within 3 months of last provider visit (or a total of 15 months).

## 2021-06-06 NOTE — TELEPHONE ENCOUNTER
Refill Approved    Rx renewed per Medication Renewal Policy. Medication was last renewed on 11/21/19.    Nasrin Lui, [facility] [place] Connection Triage/Med Refill 2/26/2020     Requested Prescriptions   Pending Prescriptions Disp Refills     traZODone (DESYREL) 50 MG tablet [Pharmacy Med Name: TRAZODONE 50 MG TABLET] 180 tablet 0     Sig: TAKE 1-2 TABLETS ( MG TOTAL) BY MOUTH AT BEDTIME.       Tricyclics/Misc Antidepressant/Antianxiety Meds Refill Protocol Passed - 2/26/2020  1:20 AM        Passed - PCP or prescribing provider visit in last year     Last office visit with prescriber/PCP: 3/20/2019 Jake Singleton MD OR same dept: 3/20/2019 Jake Singleton MD OR same specialty: 3/20/2019 Jake Singleton MD  Last physical: 8/22/2018 Last MTM visit: Visit date not found   Next visit within 3 mo: Visit date not found  Next physical within 3 mo: Visit date not found  Prescriber OR PCP: Jake Singleton MD  Last diagnosis associated with med order: 1. Other depression  - traZODone (DESYREL) 50 MG tablet [Pharmacy Med Name: TRAZODONE 50 MG TABLET]; TAKE 1-2 TABLETS ( MG TOTAL) BY MOUTH AT BEDTIME.  Dispense: 180 tablet; Refill: 0    If protocol passes may refill for 12 months if within 3 months of last provider visit (or a total of 15 months).

## 2021-06-07 NOTE — TELEPHONE ENCOUNTER
Medication Request  Medication name: Pro Air HFA 90 mcg inhaler (Alburterol Sulphate inhalation aerosol)  Requested Pharmacy: [facility], [place]  Reason for request: out of inhaler  When did you use medication last?: 2 weeks ago but doesn't want to be without one.  Patient offered appointment:  N/A  Okay to leave a detailed message: Yes

## 2021-06-07 NOTE — TELEPHONE ENCOUNTER
sertraline (ZOLOFT) 50 MG tablet [[id number]]     Electronically signed by: Nasrin Lui RN on 04/23/19 1436  Status: Discontinued    Ordering user: Nasrin Lui RN 04/23/19 1436  Ordering provider: Jake Singleton MD    Authorized by: Jake Singleton MD    Frequency:  04/23/19 - 09/23/19  Discontinued by: Sherri Delgado, PharmD 09/23/19 3957 [Reorder]

## 2021-06-08 NOTE — TELEPHONE ENCOUNTER
RN cannot approve Refill Request    RN can NOT refill this medication PCP messaged that patient is overdue for Office Visit. Last office visit: 3/20/2019 Jake Singleton MD Last Physical: 8/22/2018 Last MTM visit: Visit date not found Last visit same specialty: 3/20/2019 Jake Singleton MD.  Next visit within 3 mo: Visit date not found  Next physical within 3 mo: Visit date not found      Tricia Concepcion, [facility] Triage/Med Refill 5/24/2020    Requested Prescriptions   Pending Prescriptions Disp Refills     traZODone (DESYREL) 50 MG tablet [Pharmacy Med Name: TRAZODONE 50 MG TABLET] 180 tablet 0     Sig: TAKE 1-2 TABLETS ( MG TOTAL) BY MOUTH AT BEDTIME.       Tricyclics/Misc Antidepressant/Antianxiety Meds Refill Protocol Failed - 5/22/2020 12:17 AM        Failed - PCP or prescribing provider visit in last year     Last office visit with prescriber/PCP: 3/20/2019 Jake Singleton MD OR same dept: Visit date not found OR same specialty: 3/20/2019 Jake Singleton MD  Last physical: 8/22/2018 Last MTM visit: Visit date not found   Next visit within 3 mo: Visit date not found  Next physical within 3 mo: Visit date not found  Prescriber OR PCP: Jake Singleton MD  Last diagnosis associated with med order: 1. Other depression  - traZODone (DESYREL) 50 MG tablet [Pharmacy Med Name: TRAZODONE 50 MG TABLET]; TAKE 1-2 TABLETS ( MG TOTAL) BY MOUTH AT BEDTIME.  Dispense: 180 tablet; Refill: 0    2. Alcohol abuse  - sertraline (ZOLOFT) 100 MG tablet [Pharmacy Med Name: SERTRALINE  MG TABLET]; TAKE 1 TABLET BY MOUTH EVERY DAY  Dispense: 90 tablet; Refill: 1    If protocol passes may refill for 12 months if within 3 months of last provider visit (or a total of 15 months).                sertraline (ZOLOFT) 100 MG tablet [Pharmacy Med Name: SERTRALINE  MG TABLET] 90 tablet 1     Sig: TAKE 1 TABLET BY MOUTH EVERY DAY       [facility] Refill Protocol  Failed - 5/22/2020 12:17 AM        Failed - PCP or  prescribing provider visit in last year     Last office visit with prescriber/PCP: 3/20/2019 Jake Singleton MD OR same dept: Visit date not found OR same specialty: 3/20/2019 Jake Singleton MD  Last physical: 8/22/2018 Last MTM visit: Visit date not found   Next visit within 3 mo: Visit date not found  Next physical within 3 mo: Visit date not found  Prescriber OR PCP: Jake Singleton MD  Last diagnosis associated with med order: 1. Other depression  - traZODone (DESYREL) 50 MG tablet [Pharmacy Med Name: TRAZODONE 50 MG TABLET]; TAKE 1-2 TABLETS ( MG TOTAL) BY MOUTH AT BEDTIME.  Dispense: 180 tablet; Refill: 0    2. Alcohol abuse  - sertraline (ZOLOFT) 100 MG tablet [Pharmacy Med Name: SERTRALINE  MG TABLET]; TAKE 1 TABLET BY MOUTH EVERY DAY  Dispense: 90 tablet; Refill: 1    If protocol passes may refill for 12 months if within 3 months of last provider visit (or a total of 15 months).

## 2021-06-10 NOTE — TELEPHONE ENCOUNTER
RN cannot approve Refill Request    RN can NOT refill this medication PCP messaged that patient is overdue for Office Visit. Last office visit: 3/20/2019 Jake Singleton MD Last Physical: 8/22/2018 Last MTM visit: Visit date not found Last visit same specialty: 3/20/2019 Jake Singleton MD.  Next visit within 3 mo: Visit date not found  Next physical within 3 mo: Visit date not found      Jaylene Diggs, [facility] Triage/Med Refill 8/25/2020    Requested Prescriptions   Pending Prescriptions Disp Refills     traZODone (DESYREL) 50 MG tablet [Pharmacy Med Name: TRAZODONE 50 MG TABLET] 180 tablet 0     Sig: TAKE 1-2 TABLETS ( MG TOTAL) BY MOUTH AT BEDTIME.       Tricyclics/Misc Antidepressant/Antianxiety Meds Refill Protocol Failed - 8/24/2020 12:14 AM        Failed - PCP or prescribing provider visit in last year     Last office visit with prescriber/PCP: 3/20/2019 Jake Singleton MD OR same dept: Visit date not found OR same specialty: 3/20/2019 Jake Singleton MD  Last physical: 8/22/2018 Last MTM visit: Visit date not found   Next visit within 3 mo: Visit date not found  Next physical within 3 mo: Visit date not found  Prescriber OR PCP: Jake Singleton MD  Last diagnosis associated with med order: 1. Other depression  - traZODone (DESYREL) 50 MG tablet [Pharmacy Med Name: TRAZODONE 50 MG TABLET]; TAKE 1-2 TABLETS ( MG TOTAL) BY MOUTH AT BEDTIME.  Dispense: 180 tablet; Refill: 0    If protocol passes may refill for 12 months if within 3 months of last provider visit (or a total of 15 months).

## 2021-06-11 NOTE — TELEPHONE ENCOUNTER
RN cannot approve Refill Request    RN can NOT refill this medication medication not on med list. Last office visit: 3/20/2019 Jake Singleton MD Last Physical: 8/22/2018 Last MTM visit: Visit date not found Last visit same specialty: 3/20/2019 Jake Singleton MD.  Next visit within 3 mo: Visit date not found  Next physical within 3 mo: Visit date not found      Tricia Concepcion, [facility] Triage/Med Refill 9/23/2020    Requested Prescriptions   Pending Prescriptions Disp Refills     cloNIDine HCL (CATAPRES) 0.1 MG tablet [Pharmacy Med Name: CLONIDINE HCL 0.1 MG TABLET] 90 tablet 0     Sig: TAKE 1 TABLET BY MOUTH EVERYDAY AT BEDTIME       Clonidine/Hydralazine Refill Protocol Failed - 9/21/2020 12:26 AM        Failed - PCP or prescribing provider visit in past 12 months       Last office visit with prescriber/PCP: 3/20/2019 Jake Singleton MD OR same dept: Visit date not found OR same specialty: 3/20/2019 Jake Singleton MD  Last physical: 8/22/2018 Last MTM visit: Visit date not found   Next visit within 3 mo: Visit date not found  Next physical within 3 mo: Visit date not found  Prescriber OR PCP: Jake Singleton MD  Last diagnosis associated with med order: 1. Benign essential hypertension  - cloNIDine HCL (CATAPRES) 0.1 MG tablet [Pharmacy Med Name: CLONIDINE HCL 0.1 MG TABLET]; TAKE 1 TABLET BY MOUTH EVERYDAY AT BEDTIME  Dispense: 90 tablet; Refill: 3    2. Alcohol abuse  - cloNIDine HCL (CATAPRES) 0.1 MG tablet [Pharmacy Med Name: CLONIDINE HCL 0.1 MG TABLET]; TAKE 1 TABLET BY MOUTH EVERYDAY AT BEDTIME  Dispense: 90 tablet; Refill: 3    If protocol passes may refill for 12 months if within 3 months of last provider visit (or a total of 15 months).             Passed - Blood pressure filed in past 12 months     BP Readings from Last 1 Encounters:   09/05/20 (!) 137/92

## 2021-06-12 NOTE — PROGRESS NOTES
CC: I am here for a checkup; I need to lose some weight    HPI: Patient has been under my care for years patient does have benign essential hypertension he has been prescribed lisinopril 10 mg 1 daily and Lipitor 10 mg once daily for hyperlipidemia.  The patient states that he takes his medications 3 out of 5 days and the reason for this as he generally sometimes forgets.  I talked with him and stressed how important it is to take these on a daily basis as these will increase longevity and decrease morbidity.  I suggest he put them by his tooth cleaning materials.  His weight is about the same perhaps up a bit he needs to lose about 30 pounds he promises to do this he will increase his aerobic exercise.  The patient is employed at  is about to move they have 2 children.  He denies any constitutional complaints no febrile illnesses no visual complaints hearing loss dysphagia shortness of breath dyspnea chest pain angina abdominal complaints diarrhea constipation urgency frequency dysuria no problems with sexual function no disturbances of gait or station.  I have personally reviewed his family and social history surgeries allergies problems list.  Patient will be seen on an annual basis.  I did fill out his insurance form stating that he has had a health screening physical and was advised about the importance of taking his medications cholesterol control blood pressure control stress control.  All medical questions were answered to the best of my ability      Review of Systems: A complete 14 point review of systems was obtained and is negative or as stated in the history of present illness.    Past Medical History:   Diagnosis Date     Achilles tendon rupture      DVT (deep venous thrombosis)      Family History   Problem Relation Age of Onset     No Medical Problems Mother      Hypertension Father      Past Surgical History:   Procedure Laterality Date     ACHILLES TENDON REPAIR       KNEE ARTHROSCOPY Right     x2  "    Social History   Substance Use Topics     Smoking status: Never Smoker     Smokeless tobacco: Never Used     Alcohol use 3.0 oz/week     3 Glasses of wine, 2 Cans of beer per week       PE:   BP (!) 138/92  Pulse 80  Ht 5' 9\" (1.753 m)  Wt (!) 228 lb (103.4 kg)  SpO2 95%  BMI 33.67 kg/m2     General Appearance:  Alert, cooperative, no distress  Head:  Normocephalic, no obvious abnormality  Ears: TM anatomy normal  Eyes:  PERRL, EOM's intact, conjunctiva and corneas clear  Nose:  Nares symmetrical, septum midline, mucosa pink, no sinus tenderness  Throat:  Lips, tongue, and mucosa are moist, pink, and intact  Neck:  Supple, symmetrical, trachea midline, no adenopathy; thyroid: no enlargement, symmetric,no tenderness/mass/nodules; no carotid bruit, no JVD  Back:  Symmetrical, no curvature, ROM normal, no CVA tenderness  Chest/Breast:  No mass or tenderness  Lungs:  Clear to auscultation bilaterally, respirations unlabored   Heart:  Normal PMI, regular rate & rhythm, S1 and S2 normal, no murmurs, rubs, or gallops  Abdomen:  Soft, non-tender, bowel sounds active all four quadrants, no mass, or organomegaly  Musculoskeletal:  Tone and strength strong and symmetrical, all extremities  Lymphatic:  No adenopathy  Skin/Hair/Nails:  Skin warm, dry, and intact, no rashes  Neurologic:  Alert and oriented x3, no cranial nerve deficits, normal strength and tone, gait steady  Extremities:  No edema.  Akhil's sign negative.    Genitourinary: deferred  Pulses:  Equal bilaterally    Impression:     1. Routine general medical examination at a health care facility  Comprehensive Metabolic Panel    Electrocardiogram Perform - Clinic    [facility] [place]    Urinalysis-UC if Indicated    HM1(CBC and Differential)    HM1 (CBC with Diff)   2. Hypercholesteremia  Comprehensive Metabolic Panel    Electrocardiogram Perform - Clinic    [facility] [place]    Urinalysis-UC if Indicated    HM1(CBC and Differential)    HM1 (CBC with Diff)   3. " Hypertension  Comprehensive Metabolic Panel    Electrocardiogram Perform - Clinic    Lip[facility] [place]    Urinalysis-UC if Indicated    HM1(CBC and Differential)    HM1 (CBC with Diff)        PLAN:   1. Routine general medical examination at a health care facility  Workup to include the following the patient is aware of my chart  - Comprehensive Metabolic Panel  - Electrocardiogram Perform - Clinic  - [facility] [place]  - Urinalysis-UC if Indicated  - HM1(CBC and Differential)  - HM1 (CBC with Diff)    2. Hypercholesteremia  Patient is to continue to take Lipitor 10 mg  - Comprehensive Metabolic Panel  - Electrocardiogram Perform - Clinic  - [facility] [place]  - Urinalysis-UC if Indicated  - HM1(CBC and Differential)  - HM1 (CBC with Diff)    3. Hypertension  Patient is to continue to take  - Comprehensive Metabolic Panel  - Electrocardiogram Perform - Clinic  - [facility] [place]  - Urinalysis-UC if Indicated  - HM1(CBC and Differential)  - HM1 (CBC with Diff)      The following high BMI interventions were performed this visit: weight monitoring        This note has been dictated using voice recognition software. Any grammatical or context distortions are unintentional and inherent to the the software.

## 2021-06-12 NOTE — TELEPHONE ENCOUNTER
Who is calling:  Rubens Tena  Reason for Call:  Requesting a return phone call from provider to discuss a court ordered custody case. Rubens stated an WANDA was submitted sometime in August of 2020.  Date of last appointment with primary care: 03/20/2019  Okay to leave a detailed message: Yes

## 2021-06-13 NOTE — TELEPHONE ENCOUNTER
RN cannot approve Refill Request    RN can NOT refill this medication PCP messaged that patient is overdue for Office Visit. Last office visit: 3/20/2019 Jake Singleton MD Last Physical: 8/22/2018 Last MTM visit: Visit date not found Last visit same specialty: 3/20/2019 Jake Singleton MD.  Next visit within 3 mo: Visit date not found  Next physical within 3 mo: Visit date not found      Tricia Concepcion, [facility] Triage/Med Refill 12/20/2020    Requested Prescriptions   Pending Prescriptions Disp Refills     traZODone (DESYREL) 50 MG tablet [Pharmacy Med Name: TRAZODONE 50 MG TABLET] 180 tablet 0     Sig: TAKE 1-2 TABLETS ( MG TOTAL) BY MOUTH AT BEDTIME.       Tricyclics/Misc Antidepressant/Antianxiety Meds Refill Protocol Failed - 12/19/2020  9:25 AM        Failed - PCP or prescribing provider visit in last year     Last office visit with prescriber/PCP: 3/20/2019 Jake Singleton MD OR same dept: Visit date not found OR same specialty: 3/20/2019 Jake Singleton MD  Last physical: 8/22/2018 Last MTM visit: Visit date not found   Next visit within 3 mo: Visit date not found  Next physical within 3 mo: Visit date not found  Prescriber OR PCP: Jake Singleton MD  Last diagnosis associated with med order: 1. Other specified depressive episodes  - traZODone (DESYREL) 50 MG tablet [Pharmacy Med Name: TRAZODONE 50 MG TABLET]; TAKE 1-2 TABLETS ( MG TOTAL) BY MOUTH AT BEDTIME.  Dispense: 180 tablet; Refill: 0    If protocol passes may refill for 12 months if within 3 months of last provider visit (or a total of 15 months).

## 2021-06-14 NOTE — ANESTHESIA PROCEDURE NOTES
Emergent Intubation    Date/Time: 1/1/2021 7:06 PM    CRNA: Krzysztof Robbins CRNA  Indications: respiratory distress    Medications Administered  Etomidate (AMIDATE) injection, 100 mgMedication administration time:1/1/2021 7:06 PMRoute: oral  Technique: fiberoptic assisted (glidescope 4)  Tube size: 8.0 mm  Tube type: cuffed  Cuff inflated: yes  Level of Difficulty: 0ETT to lip: 24 cm  Tube secured with: ETT carlson  ETCO2 = Yes  Breath sounds: equal  SaO2 %: 97  Comments: Pt intubated with no complications. Bilateral breaths sounds per RT with positive color change with EtCO2 detector. No changes to pt's oral mucosa. Sign out to MD Sanchez all questions answered. Will monitor closely per primary team.

## 2021-06-14 NOTE — TELEPHONE ENCOUNTER
Called Solitario's Mother to discuss things. She found his apartment to have lots of blood--like he had vomitted or coughed it up and she was wondering if this was what we found--I stated that I didn't think he was in hemorrhagic shock (hgb did drop 12-->9 dilutional but was stable all night and into the morning). I do think lactic acidosis from septic shock made his hypotension so profound. His liver prevented clearance of the lactate and the numerous pressors likely only worsened this. She explained that she didn't seem to get the impression his drinking was as severe as this, however I explained that the acute pancreatitis in September was likely the result of significant alcohol use. She thanked the team for the care solitario received.    Cate Sanchez MD  Electronically signed on 1/20/2021 10:02 AM

## 2021-06-15 NOTE — PROGRESS NOTES
"Assessment:   1. Acute nasopharyngitis  Likely secondary to post nasal drip causing inflammation of the bronchia   - albuterol (PROAIR HFA) 90 mcg/actuation inhaler; Inhale 2 puffs every 4 (four) hours as needed for wheezing.  Dispense: 8.5 Inhaler; Refill: 2  - predniSONE (DELTASONE) 20 MG tablet; Take 2 tablets (40 mg total) by mouth daily.  Dispense: 10 tablet; Refill: 0  - loratadine (CLARITIN) 10 mg tablet; Take 1 tablet (10 mg total) by mouth daily.  Dispense: 30 tablet; Refill: 2     Plan:   Nasal saline sprays.  Nasal steroids per medication orders.  Antihistamines per medication orders.  Follow up in 7 days or as needed.     Subjective:   Jamey Gallegos is a 42 y.o. male who presents for evaluation of sinus pain. Symptoms include: congestion, cough, facial pain, frequent clearing of the throat, headaches, nasal congestion, post nasal drip and sinus pressure. Onset of symptoms was 3 days ago. Symptoms have been gradually worsening since that time. Past history is significant for asthma. Patient is a non-smoker.    The following portions of the patient's history were reviewed and updated as appropriate: allergies, current medications, past family history, past medical history, past social history, past surgical history and problem list.    Review of Systems  A 12 point comprehensive review of systems was negative except as noted.     Objective:   /78  Pulse 64  Ht 5' 9\" (1.753 m)  Wt (!) 239 lb 8 oz (108.6 kg)  SpO2 98%  BMI 35.37 kg/m2  General appearance: alert, appears stated age and cooperative  Head: Normocephalic, without obvious abnormality, atraumatic  Eyes: conjunctivae/corneas clear. PERRL, EOM's intact. Fundi benign.  Ears: normal TM's and external ear canals both ears  Nose: clear discharge, moderate congestion, turbinates swollen  Throat: lips, mucosa, and tongue normal; teeth and gums normal  Neck: no adenopathy, no carotid bruit, no JVD, supple, symmetrical, trachea midline and " thyroid not enlarged, symmetric, no tenderness/mass/nodules  Lungs: wheezes bilaterally  Heart: regular rate and rhythm, S1, S2 normal, no murmur, click, rub or gallop  Pulses: 2+ and symmetric  Skin: Skin color, texture, turgor normal. No rashes or lesions  Lymph nodes: Cervical, supraclavicular, and axillary nodes normal.  Neurologic: Grossly normal

## 2021-06-16 PROBLEM — K70.10 ALCOHOLIC HEPATITIS WITHOUT ASCITES (H): Status: ACTIVE | Noted: 2020-01-01

## 2021-06-16 PROBLEM — J96.00 ACUTE RESPIRATORY FAILURE (H): Status: ACTIVE | Noted: 2021-01-01

## 2021-06-16 PROBLEM — F10.10 ALCOHOL ABUSE: Status: ACTIVE | Noted: 2021-01-01

## 2021-06-20 NOTE — PROGRESS NOTES
Assessment:      Healthy male exam.      Plan:       Blood tests: Basic metabolic panel.     Subjective:      Jamey Gallegos is a 42 y.o. male who presents for an annual exam. The patient reports that there is not domestic violence in his life.  Patient presents for an annual physical.  Patient does have benign essential hypertension it is under poor control I have started him on Tenoretic 2012.5 he will institute therapy today.  Patient will continue on Lipitor for his hyperlipidemia.  Patient was counseled on abstaining from alcohol 2 days per week limiting his total alcohol intake to 5-7 ounces per week.  His ACAs evaluation was done.  The patient uses albuterol as a rescue inhaler asthma is under good control.  Patient's 14 point review of systems entirely negative.  Patient will    Healthy Habits:   Regular Exercise: No  Sunscreen Use: Yes  Healthy Diet: Yes  Dental Visits Regularly: Yes  Seat Belt: Yes  Sexually active: Yes  Monthly Self Testicular Exams:  Yes  Hemoccults: No  Flex Sig: No  Colonoscopy: No  Lipid Profile: Yes  Glucose Screen: Yes  Prevention of Osteoporosis: No  Last Dexa: No  Guns at Home:  No      Immunization History   Administered Date(s) Administered     Influenza [id number], 02/02/2010     Influenza, seasonal,quad inj 36+ mos 09/22/2015, 09/26/2016     Tdap 06/01/2003     Immunization status: up to date and documented.    No exam data present     Current Outpatient Prescriptions   Medication Sig Dispense Refill     albuterol (PROAIR HFA) 90 mcg/actuation inhaler Inhale 2 puffs every 4 (four) hours as needed for wheezing. 8.5 Inhaler 2     atorvastatin (LIPITOR) 10 MG tablet Take 1 tablet (10 mg total) by mouth at bedtime. 90 tablet 0     loratadine (CLARITIN) 10 mg tablet Take 1 tablet (10 mg total) by mouth daily. 30 tablet 2     MULTIVIT &MINERALS/FERROUS FUM (MULTI VITAMIN ORAL) Take 1 tablet by mouth daily.       predniSONE (DELTASONE) 20 MG tablet Take 2 tablets (40 mg total) by  "mouth daily. 10 tablet 0     No current facility-administered medications for this visit.      Past Medical History:   Diagnosis Date     Achilles tendon rupture      DVT (deep venous thrombosis) (H)      Past Surgical History:   Procedure Laterality Date     ACHILLES TENDON REPAIR       KNEE ARTHROSCOPY Right     x2     Sulfa (sulfonamide antibiotics)  Family History   Problem Relation Age of Onset     No Medical Problems Mother      Hypertension Father      Social History     Social History     Marital status:      Spouse name: N/A     Number of children: N/A     Years of education: N/A     Occupational History     Not on file.     Social History Main Topics     Smoking status: Never Smoker     Smokeless tobacco: Never Used     Alcohol use 3.0 oz/week     3 Glasses of wine, 2 Cans of beer per week     Drug use: No     Sexual activity: Yes     Partners: Female      Comment:       Other Topics Concern     Not on file     Social History Narrative       Review of Systems  Review of Systems      Patient denies dysphasia shortness of breath dyspnea chest pain angina abdominal pain diarrhea constipation urgency frequency dysuria    Objective:     Vitals:    08/22/18 0920   BP: (!) 184/94   Pulse: 71   Temp: 98.1  F (36.7  C)   SpO2: 98%   Weight: (!) 233 lb 2 oz (105.7 kg)   Height: 5' 9\" (1.753 m)   PainSc: 0-No pain     Body mass index is 34.43 kg/(m^2).    Physical  Physical Exam     General Appearance:  Alert, cooperative, no distress  Head:  Normocephalic, no obvious abnormality  Ears: TM anatomy normal  Eyes:  PERRL, EOM's intact, conjunctiva and corneas clear  Nose:  Nares symmetrical, septum midline, mucosa pink, no sinus tenderness  Throat:  Lips, tongue, and mucosa are moist, pink, and intact  Neck:  Supple, symmetrical, trachea midline, no adenopathy; thyroid: no enlargement, symmetric,no tenderness/mass/nodules; no carotid bruit, no JVD  Back:  Symmetrical, no curvature, ROM normal, no CVA " tenderness  Chest/Breast:  No mass or tenderness  Lungs:  Clear to auscultation bilaterally, respirations unlabored   Heart:  Normal PMI, regular rate & rhythm, S1 and S2 normal, no murmurs, rubs, or gallops  Abdomen:  Soft, non-tender, bowel sounds active all four quadrants, no mass, or organomegaly  Musculoskeletal:  Tone and strength strong and symmetrical, all extremities  Lymphatic:  No adenopathy  Skin/Hair/Nails:  Skin warm, dry, and intact, no rashes  Neurologic:  Alert and oriented x3, no cranial nerve deficits, normal strength and tone, gait steady  Extremities:  No edema.  Akhil's sign negative.    Genitourinary: Testes down no hernia rectal exam prostate was reached no masses 40 mL  Pulses:  Equal bilaterally

## 2021-06-25 NOTE — PROGRESS NOTES
Assessment:     1. Other depression  traZODone (DESYREL) 50 MG tablet   2. Mixed hyperlipidemia  atorvastatin (LIPITOR) 10 MG tablet   3. Benign essential hypertension  atorvastatin (LIPITOR) 10 MG tablet    lisinopril-hydrochlorothiazide (ZESTORETIC) 20-12.5 mg per tablet    cloNIDine HCl (CATAPRES) 0.1 MG tablet    Ambulatory referral to Medication Management   4. Alcohol abuse  cloNIDine HCl (CATAPRES) 0.1 MG tablet    Ambulatory referral to Medication Management       Plan:     1. Mixed hyperlipidemia  Continue following medication  - atorvastatin (LIPITOR) 10 MG tablet; Take 1 tablet (10 mg total) by mouth at bedtime.  Dispense: 90 tablet; Refill: 3    2. Benign essential hypertension  Continue the following we will add clonidine at bedtime  - atorvastatin (LIPITOR) 10 MG tablet; Take 1 tablet (10 mg total) by mouth at bedtime.  Dispense: 90 tablet; Refill: 3  - lisinopril-hydrochlorothiazide (ZESTORETIC) 20-12.5 mg per tablet; Take 1 tablet by mouth daily.  Dispense: 90 tablet; Refill: 3  - cloNIDine HCl (CATAPRES) 0.1 MG tablet; One tablet at bedtime  Dispense: 60 tablet; Refill: 2  - Ambulatory referral to Medication Management    3. Other depression  We will begin treatment with the following will discuss this with medication management  - traZODone (DESYREL) 50 MG tablet; Take 1 tablet (50 mg total) by mouth at bedtime.  Dispense: 30 tablet; Refill: 1    4. Alcohol abuse  Urges we will start patient on low-dose of clonidine at bedtime  - cloNIDine HCl (CATAPRES) 0.1 MG tablet; One tablet at bedtime  Dispense: 60 tablet; Refill: 2  - Ambulatory referral to Medication Management      Subjective:   Patient comes in with multiple problems today he is here for follow-up of his high lipids as well as his hypertension.  These are under good control with current medications (atorvastatin and lisinopril hydrochlorothiazide).  Patient however reports that he has had problems with alcohol and also marital discord.   Patient checked in as outpatient on March 9 because of loss of control and overuse of alcohol he is now currently seeing an alcohol counselor a psychotherapist and has an appointment with a psychiatrist this week.  Apparently there has been marital discord as a result of alcohol and wife has contacted former high school boyfriend and this is a crushing blow to the patient.  They have 2 children at home.  Patient is experiencing extreme anxiety as well as cravings he is trying to stay off of alcohol and is getting maximum counseling.  My suggestion here today was to add some clonidine which will help with his blood pressure in addition to having a history of helping with alcohol urges.  He is having difficulty sleeping as a matter fact he has insomnia for the last 5-7 days.  I am going to make a decision here to place him on some trazodone at bedtime 50 mg.  We will have medication management talk to him with regards to the multiple medicines he is on and the efficacy of starting both of these medications.  We may be a we will see him together as therapist tomorrow or jazmyn Concepcion is available to do that at our convenience.40 minute visit.  His last blood chemistries were reviewed with the patient.  No doubt his liver enzymes are probably up again because of the alcohol.    Review of Systems: A complete 14 point review of systems was obtained and is negative or as stated in the history of present illness.    Past Medical History:   Diagnosis Date     Achilles tendon rupture      DVT (deep venous thrombosis) (H)      Family History   Problem Relation Age of Onset     No Medical Problems Mother      Hypertension Father      Past Surgical History:   Procedure Laterality Date     ACHILLES TENDON REPAIR       KNEE ARTHROSCOPY Right     x2     Social History     Tobacco Use     Smoking status: Never Smoker     Smokeless tobacco: Never Used   Substance Use Topics     Alcohol use: Yes     Alcohol/week: 3.0 oz     Types: 3  "Glasses of wine, 2 Cans of beer per week     Drug use: No         Objective:   /80   Pulse 72   Ht 5' 9\" (1.753 m)   Wt 219 lb 6.4 oz (99.5 kg)   BMI 32.40 kg/m      General Appearance:  Alert, cooperative, no distress  Head:  Normocephalic, no obvious abnormality  Ears: TM anatomy normal  Eyes:  PERRL, EOM's intact, conjunctiva and corneas clear  Nose:  Nares symmetrical, septum midline, mucosa pink, no sinus tenderness  Throat:  Lips, tongue, and mucosa are moist, pink, and intact  Neck:  Supple, symmetrical, trachea midline, no adenopathy; thyroid: no enlargement, symmetric,no tenderness/mass/nodules; no carotid bruit, no JVD  Back:  Symmetrical, no curvature, ROM normal, no CVA tenderness  Chest/Breast:  No mass or tenderness  Lungs:  Clear to auscultation bilaterally, respirations unlabored   Heart:  Normal PMI, regular rate & rhythm, S1 and S2 normal, no murmurs, rubs, or gallops  Abdomen:  Soft, non-tender, bowel sounds active all four quadrants, no mass, or organomegaly  Musculoskeletal:  Tone and strength strong and symmetrical, all extremities  Lymphatic:  No adenopathy  Skin/Hair/Nails:  Skin warm, dry, and intact, no rashes  Neurologic:  Alert and oriented x3, no cranial nerve deficits, normal strength and tone, gait steady  Extremities:  No edema.  Akhil's sign negative.    Genitourinary: deferred  Pulses:  Equal bilaterally     The following high BMI interventions were performed this visit: weight monitoring      This note has been dictated using voice recognition software. Any grammatical or context distortions are unintentional and inherent to the the software.   "